# Patient Record
(demographics unavailable — no encounter records)

---

## 2024-10-24 NOTE — HISTORY OF PRESENT ILLNESS
[Y] : Positive pregnancy history [N] : Patient reports normal menses [IUD] : has an intrauterine device [Normal Amount/Duration] :  normal amount and duration [Regular Cycle Intervals] : periods have been regular [Frequency: Q ___ days] : menstrual periods occur approximately every [unfilled] days [Menarche Age: ____] : age at menarche was [unfilled] [Currently Active] : currently active [Men] : men [No] : No [Yes] : Yes [TextBox_4] : GYNHX No history of fibroids, cysts, or STDs ParaGard IUD 2016 13/28/4-6 LAST PAP 2016 [PapJerieardate] : 8 yrs ago [LMPDate] : 10/1/24 [MensesFreq] : 28 [MensesLength] : 4-6 [MensesAmount] : heavy [de-identified] : MIrena inserted 8 yrs ago [PGHxTotal] : 5 [[facility]] : 2 [PGHxAbortions] : 3 [[facility]Living] : 2 [PGHxABInduced] : 1 [PGHxABSpont] : 2 [FreeTextEntry1] : 10/1/24 [FreeTextEntry3] : IUD inserted 8 yrs ago

## 2024-10-24 NOTE — PHYSICAL EXAM
[Appropriately responsive] : appropriately responsive [Alert] : alert [No Acute Distress] : no acute distress [No Lymphadenopathy] : no lymphadenopathy [Soft] : soft [Non-distended] : non-distended [No HSM] : No HSM [No Lesions] : no lesions [No Mass] : no mass [Oriented x3] : oriented x3 [Examination Of The Breasts] : a normal appearance [No Discharge] : no discharge [No Masses] : no breast masses were palpable [Labia Majora] : normal [Labia Minora] : normal [Normal] : normal [Uterine Adnexae] : normal [IUD String] : an IUD string was noted [FreeTextEntry7] : Morbidly obese, slightly tender in left lower quadrant [FreeTextEntry5] : Need large speculum to visualize cervix

## 2024-10-24 NOTE — DISCUSSION/SUMMARY
[de-identified] : Bilateral knee pain  HPI Patient is a 33-year-old female reports to office for evaluation of her bilateral knee pain that has been aggravating her for the past several weeks.  Denies any recent trauma or injury to the area.  Walking, up/down stairs, getting up from seated position, flexing extending the knee all aggravate the patient's pain.  Admits the knee buckle/gives out on her.  She has tried OTC ibuprofen and physician directed at home knee exercises which have given her no relief.  Denies any numbness or tingling.  Bilateral knee exam is as follows: Minimal effusion noted.  No erythema or ecchymosis.  Able to perform active straight leg raise.  Knee flexion from 0 to 100 degrees with stiffness and pain.  Medial/lateral joint line tenderness to palpation.  Calf soft and nontender.  Positive Prince's.  Light touch intact throughout.  Mildly antalgic gait.  Bilateral knee x-rays taken in office today revealed no obvious fractures, subluxations, or dislocations.  Mild medial joint space narrowing noted.  Otherwise, no other significant abnormalities were seen.  Assessment/plan Explained the possibility of meniscal tear given the effusion and positive Prince's.  Bilateral knee MRI ordered for further evaluation.  Patient was advised to call the office a few days after getting the MRI done to discuss results over the phone.  Mobic 15 mg PO QD PRN was sent to patient's pharmacy to help alleviate their symptoms.  The patient was advised to rest/ice the area and may alternate with warm compresses as needed.  A script for physical therapy was printed for the patient so they can get started on that.  Follow-up in 6 weeks.  All questions/concerns were answered in detail.

## 2024-10-24 NOTE — HISTORY OF PRESENT ILLNESS
[Y] : Positive pregnancy history [N] : Patient reports normal menses [IUD] : has an intrauterine device [Normal Amount/Duration] :  normal amount and duration [Regular Cycle Intervals] : periods have been regular [Frequency: Q ___ days] : menstrual periods occur approximately every [unfilled] days [Menarche Age: ____] : age at menarche was [unfilled] [Currently Active] : currently active [Men] : men [No] : No [Yes] : Yes [TextBox_4] : GYNHX No history of fibroids, cysts, or STDs ParaGard IUD 2016 13/28/4-6 LAST PAP 2016 [PapJerieardate] : 8 yrs ago [LMPDate] : 10/1/24 [MensesFreq] : 28 [MensesLength] : 4-6 [MensesAmount] : heavy [de-identified] : MIrena inserted 8 yrs ago [PGHxTotal] : 5 [[facility]Full[facility]] : 2 [PGHxAbortions] : 3 [[facility]xLiving] : 2 [PGHxABInduced] : 1 [PGHxABSpont] : 2 [FreeTextEntry1] : 10/1/24 [FreeTextEntry3] : IUD inserted 8 yrs ago

## 2024-10-24 NOTE — DISCUSSION/SUMMARY
[FreeTextEntry1] : 33 to  annual gyn, metrorrhagia, STD testing pap hpv  Pelvic sono- iud in situ 2016